# Patient Record
Sex: FEMALE | Race: WHITE | Employment: UNEMPLOYED | URBAN - METROPOLITAN AREA
[De-identification: names, ages, dates, MRNs, and addresses within clinical notes are randomized per-mention and may not be internally consistent; named-entity substitution may affect disease eponyms.]

---

## 2017-02-08 ENCOUNTER — OFFICE VISIT (OUTPATIENT)
Dept: FAMILY MEDICINE CLINIC | Age: 62
End: 2017-02-08

## 2017-02-08 ENCOUNTER — HOSPITAL ENCOUNTER (OUTPATIENT)
Dept: LAB | Age: 62
Discharge: HOME | End: 2017-02-08

## 2017-02-08 VITALS
WEIGHT: 174 LBS | BODY MASS INDEX: 31.62 KG/M2 | HEART RATE: 73 BPM | DIASTOLIC BLOOD PRESSURE: 84 MMHG | TEMPERATURE: 98.4 F | SYSTOLIC BLOOD PRESSURE: 145 MMHG

## 2017-02-08 DIAGNOSIS — E03.9 ACQUIRED HYPOTHYROIDISM: ICD-10-CM

## 2017-02-08 DIAGNOSIS — I10 ESSENTIAL HYPERTENSION: Primary | ICD-10-CM

## 2017-02-08 DIAGNOSIS — I10 ESSENTIAL HYPERTENSION: ICD-10-CM

## 2017-02-08 LAB
ALBUMIN SERPL BCP-MCNC: 4 G/DL (ref 3.5–5)
ALBUMIN/GLOB SERPL: 1.1 {RATIO} (ref 1.1–2.2)
ALP SERPL-CCNC: 92 U/L (ref 45–117)
ALT SERPL-CCNC: 49 U/L (ref 12–78)
ANION GAP BLD CALC-SCNC: 8 MMOL/L (ref 5–15)
AST SERPL W P-5'-P-CCNC: 40 U/L (ref 15–37)
BILIRUB SERPL-MCNC: 1 MG/DL (ref 0.2–1)
BUN SERPL-MCNC: 8 MG/DL (ref 6–20)
BUN/CREAT SERPL: 12 (ref 12–20)
CALCIUM SERPL-MCNC: 9.6 MG/DL (ref 8.5–10.1)
CHLORIDE SERPL-SCNC: 103 MMOL/L (ref 97–108)
CO2 SERPL-SCNC: 27 MMOL/L (ref 21–32)
CREAT SERPL-MCNC: 0.66 MG/DL (ref 0.55–1.02)
GLOBULIN SER CALC-MCNC: 3.8 G/DL (ref 2–4)
GLUCOSE SERPL-MCNC: 105 MG/DL (ref 65–100)
POTASSIUM SERPL-SCNC: 3.9 MMOL/L (ref 3.5–5.1)
PROT SERPL-MCNC: 7.8 G/DL (ref 6.4–8.2)
SODIUM SERPL-SCNC: 138 MMOL/L (ref 136–145)
TSH SERPL DL<=0.05 MIU/L-ACNC: 0.17 UIU/ML (ref 0.36–3.74)

## 2017-02-08 PROCEDURE — 80053 COMPREHEN METABOLIC PANEL: CPT | Performed by: NURSE PRACTITIONER

## 2017-02-08 PROCEDURE — 84443 ASSAY THYROID STIM HORMONE: CPT | Performed by: NURSE PRACTITIONER

## 2017-02-08 RX ORDER — LEVOTHYROXINE SODIUM 88 UG/1
TABLET ORAL
Qty: 30 TAB | Refills: 3 | Status: SHIPPED | OUTPATIENT
Start: 2017-02-08 | End: 2017-02-14 | Stop reason: DRUGHIGH

## 2017-02-08 RX ORDER — LISINOPRIL 20 MG/1
20 TABLET ORAL 2 TIMES DAILY
Qty: 60 TAB | Refills: 3 | Status: SHIPPED | OUTPATIENT
Start: 2017-02-08 | End: 2017-04-12 | Stop reason: SDUPTHER

## 2017-02-08 RX ORDER — AMLODIPINE BESYLATE 10 MG/1
TABLET ORAL
Qty: 30 TAB | Refills: 3 | Status: SHIPPED | OUTPATIENT
Start: 2017-02-08 | End: 2017-04-12 | Stop reason: SDUPTHER

## 2017-02-08 NOTE — PROGRESS NOTES
Coordination of Care  1. Have you been to the ER, urgent care clinic since your last visit? Hospitalized since your last visit? No    2. Have you seen or consulted any other health care providers outside of the [address] [place] [address] since your last visit? Include any pap smears or colon screening. No    Medications  Medication Reconciliation Performed: no  Patient does need refills     Learning Assessment Complete?  yes

## 2017-02-08 NOTE — PROGRESS NOTES
Assessment/Plan:       ICD-10-CM ICD-9-CM    1. Essential hypertension I10 401.9 lisinopril (PRINIVIL, ZESTRIL) 20 mg tablet      amLODIPine (NORVASC) 10 mg tablet      METABOLIC PANEL, COMPREHENSIVE   2. Acquired hypothyroidism E03.9 244.9 levothyroxine (SYNTHROID) 88 mcg tablet      TSH 3RD GENERATION     Follow-up Disposition:  Return in about 4 weeks (around 3/8/2017) for hypertension (systolic < 042?). [facility]  Subjective:   Pamela Alva is a 60 y.o. WHITE OR  female who speaks [place]. Chief Complaint   Patient presents with    Hypertension    Thyroid Problem    Medication Refill    History of Present Illness: no complaints. Thyroid  denies fatigue, weight changes, heat/cold intolerance, bowel/skin changes or CVS symptoms    Current Outpatient Prescriptions   Medication Sig Dispense Refill    lisinopril (PRINIVIL, ZESTRIL) 20 mg tablet Take 1 Tab by mouth two (2) times a day. 60 Tab 3    amLODIPine (NORVASC) 10 mg tablet TAKE ONE TABLET BY MOUTH ONE TIME DAILY 30 Tab 3    levothyroxine (SYNTHROID) 88 mcg tablet TAKE ONE TABLET BY MOUTH ONE TIME DAILY 30 Tab 3    cyclobenzaprine (FLEXERIL) 5 mg tablet Take 2 Tabs by mouth nightly as needed for Muscle Spasm(s). 30 Tab 0    ranitidine (ZANTAC) 150 mg tablet TAKE ONE OR TWO TABLETS BY MOUTH TWICE DAILY 120 Tab PRN    terbinafine (LAMISIL) 250 mg tablet Take 1 Tab by mouth daily. 14 Tab 0    clopidogrel (PLAVIX) 75 mg tablet Take 1 Tab by mouth daily. 90 Tab 3    fluticasone (FLONASE) 50 mcg/Actuation nasal spray USE 2 SPRAYS IN EACH NOSTRIL EVERY DAY 1 Bottle 11    fexofenadine (ALLEGRA) 180 mg tablet TAKE ONE TABLET BY MOUTH ONCE DAILY 30 Tab 11    budesonide-formoterol (SYMBICORT) 160-4.5 mcg/Actuation HFA inhaler Take 2 Puffs by inhalation two (2) times a day. 3 Inhaler 3    aspirin (ASPIRIN) 325 mg tablet Take 325 mg by mouth daily.  coenzyme q10 (CO Q-10) 10 mg Cap Take  by mouth.       OTHER,NON-FORMULARY, gugulipids (otc) once daily       clotrimazole-betamethasone (LOTRISONE) topical cream Apply  to affected area two (2) times a day. 15 g 0    nystatin (MYCOSTATIN) topical cream Apply  to affected area two (2) times a day. 45 g 0    albuterol (PROVENTIL, VENTOLIN) 90 mcg/Actuation inhaler Take 2 Puffs by inhalation every six (6) hours as needed.  omega-3 fatty acids-vitamin e (FISH OIL) 1,000 mg Cap Take 2 Caps by mouth two (2) times a day. Review of Systems: Negative for: fever, chest pain, shortness of breath, leg swelling, exertional dyspnea, palpitations. Past Surgical History: She  has a past surgical history that includes endoscopy, colon, diagnostic and cardiac surg procedure unlist.   Social History: She  reports that she has never smoked. She has never used smokeless tobacco. She reports that she does not drink alcohol. Objective:     Vitals:    02/08/17 1311   BP: 145/84   Pulse: 73   Temp: 98.4 °F (36.9 °C)   TempSrc: Oral   Weight: 174 lb (78.9 kg)    No LMP recorded. Patient is postmenopausal.   Wt Readings from Last 2 Encounters:   02/08/17 174 lb (78.9 kg)   12/14/16 175 lb (79.4 kg)     Lab Review:No results found for any visits on 02/08/17. Physical Examination:   General appearance - well developed, no acute distress. Neck - Supple, no adenopathy. Thyroid is normal in size without nodules or tenderness. Chest - clear to auscultation. Heart - regular rate and rhythm without murmurs, rubs, or gallops. Abdomen - bowel sounds present x 4, NT, ND. Extremities - pulses intact. No peripheral edema. Assessment/Plan:   Jing Peguero was seen today for hypertension, thyroid problem and medication refill. Diagnoses and all orders for this visit:    Essential hypertension  -     lisinopril (PRINIVIL, ZESTRIL) 20 mg tablet; Take 1 Tab by mouth two (2) times a day.   -     amLODIPine (NORVASC) 10 mg tablet; TAKE ONE TABLET BY MOUTH ONE TIME DAILY  -     METABOLIC PANEL, COMPREHENSIVE; Future    Acquired hypothyroidism  -     levothyroxine (SYNTHROID) 88 mcg tablet; TAKE ONE TABLET BY MOUTH ONE TIME DAILY  -     TSH 3RD GENERATION; Future    Follow-up Disposition:  Return in about 4 weeks (around 3/8/2017) for hypertension (systolic < 168?). -dry cough - discussed option of switching to ARB but she prefers to stay with ACEI due to cost.  Manish Mcduffie, MSN, RN, FNP-BC, BC-ADM  Leo Adkins expressed understanding of this plan.

## 2017-02-10 ENCOUNTER — TELEPHONE (OUTPATIENT)
Dept: FAMILY PLANNING/WOMEN'S HEALTH CLINIC | Age: 62
End: 2017-02-10

## 2017-02-10 NOTE — TELEPHONE ENCOUNTER
Answering machine identified as \"Mary. \"  I left a message to call CRISTAL. Information to discuss: If you have not had thyroid medication for 5 years, or any time recently, then your level represents that your thyroid is OVERactive (hyperthyroid). You need to STOP TAKING your medication levothyroxine. (This would help you if you were HYPOthyroid). When you return at your follow up appointment, we will check your thyroid tests again.   In summary: stop levothyroxine 88 mcg that I just prescribed; we will do labs at your follow up appointment.  (labs to check: TSH, free T4)

## 2017-02-14 DIAGNOSIS — E03.9 ACQUIRED HYPOTHYROIDISM: Primary | ICD-10-CM

## 2017-02-14 NOTE — TELEPHONE ENCOUNTER
The call was returned to the pt. She was told her last labs for her thyroid showed she had Hyperthyroid. The provider wanted her to stop the Levothyroxine 88 mcg now. She has an appt to return to the clinic on 04/12/17 at 2 pm with Royal Matt NP. Her thyroid levels will be rechecked on the day of her appt. She verbalized understanding.  Ethan Lara RN

## 2017-02-15 NOTE — PROGRESS NOTES
To clarify,  This patient had not taken thyroid medication for 10 years until she came to the clinic on 12/14/16. Her previous dose of medication was 88 mcg. I re-started her previous dose, then had her return to recheck levels at 8 weeks. Her levels then showed that tsh was too low.  TSH 02/08/2017 0.17* 0.36 - 3.74 uIU/mL Final    Comment: (NOTE)  Due to TSH heterogeneity, both structurally and degree of   glycosylation, monoclonal antibodies used in the TSH assay may not   accurately quantitate TSH. Therefore, this result should be   correlated with clinical findings as well as with other assessments   of thyroid function, e.g., free T4, free T3. I then had her stop the medication, to recheck levels off medication.

## 2017-04-12 ENCOUNTER — HOSPITAL ENCOUNTER (OUTPATIENT)
Dept: LAB | Age: 62
Discharge: HOME | End: 2017-04-12

## 2017-04-12 ENCOUNTER — OFFICE VISIT (OUTPATIENT)
Dept: FAMILY MEDICINE CLINIC | Age: 62
End: 2017-04-12

## 2017-04-12 VITALS
OXYGEN SATURATION: 97 % | BODY MASS INDEX: 33.07 KG/M2 | HEART RATE: 73 BPM | SYSTOLIC BLOOD PRESSURE: 141 MMHG | DIASTOLIC BLOOD PRESSURE: 88 MMHG | WEIGHT: 182 LBS | TEMPERATURE: 98.3 F

## 2017-04-12 DIAGNOSIS — E05.90 HYPERTHYROIDISM: ICD-10-CM

## 2017-04-12 DIAGNOSIS — J06.9 UPPER RESPIRATORY TRACT INFECTION, UNSPECIFIED TYPE: ICD-10-CM

## 2017-04-12 DIAGNOSIS — I10 ESSENTIAL HYPERTENSION: ICD-10-CM

## 2017-04-12 DIAGNOSIS — E05.90 HYPERTHYROIDISM: Primary | ICD-10-CM

## 2017-04-12 LAB
T4 FREE SERPL-MCNC: 0.6 NG/DL (ref 0.8–1.5)
TSH SERPL DL<=0.05 MIU/L-ACNC: 20.6 UIU/ML (ref 0.36–3.74)

## 2017-04-12 PROCEDURE — 84439 ASSAY OF FREE THYROXINE: CPT | Performed by: NURSE PRACTITIONER

## 2017-04-12 PROCEDURE — 84480 ASSAY TRIIODOTHYRONINE (T3): CPT | Performed by: NURSE PRACTITIONER

## 2017-04-12 PROCEDURE — 83520 IMMUNOASSAY QUANT NOS NONAB: CPT | Performed by: NURSE PRACTITIONER

## 2017-04-12 PROCEDURE — 84443 ASSAY THYROID STIM HORMONE: CPT | Performed by: NURSE PRACTITIONER

## 2017-04-12 RX ORDER — AMLODIPINE BESYLATE 10 MG/1
TABLET ORAL
Qty: 90 TAB | Refills: 11 | Status: SHIPPED | OUTPATIENT
Start: 2017-04-12 | End: 2018-05-02 | Stop reason: SDUPTHER

## 2017-04-12 RX ORDER — ALBUTEROL SULFATE 90 UG/1
2 AEROSOL, METERED RESPIRATORY (INHALATION)
Qty: 1 INHALER | Refills: 1 | Status: SHIPPED | OUTPATIENT
Start: 2017-04-12 | End: 2018-05-09

## 2017-04-12 RX ORDER — LISINOPRIL 20 MG/1
20 TABLET ORAL 2 TIMES DAILY
Qty: 180 TAB | Refills: 3 | Status: SHIPPED | OUTPATIENT
Start: 2017-04-12 | End: 2018-05-02 | Stop reason: SDUPTHER

## 2017-04-12 RX ORDER — AZITHROMYCIN 250 MG/1
TABLET, FILM COATED ORAL
Qty: 6 TAB | Refills: 0 | Status: SHIPPED | OUTPATIENT
Start: 2017-04-12 | End: 2017-04-17

## 2017-04-12 NOTE — PATIENT INSTRUCTIONS

## 2017-04-12 NOTE — PROGRESS NOTES
Avs discussed with Judy Hauser by Discharge Nurse Luc Garcia LPN. Discussed medications prescribed, good rx coupon given. Verbalized understanding and no further questions.  AVS printed and given to patient Luc Garcia LPN

## 2017-04-12 NOTE — PROGRESS NOTES
Coordination of Care  1. Have you been to the ER, urgent care clinic since your last visit? Hospitalized since your last visit? No    2. Have you seen or consulted any other health care providers outside of the [address] [place] [address] since your last visit? Include any pap smears or colon screening. No    Medications  Medication Reconciliation Performed: no  Patient does need refills     Learning Assessment Complete?  yes

## 2017-04-12 NOTE — PROGRESS NOTES
Subjective:     Chief Complaint   Patient presents with    Hypertension     f/u    Cough     x 12 days        She  is a 60 y.o. female who presents for routine f/u and URI complaints. Onset approx 1 1/2 weeks ago. Notes clear, productive exudate. No pattern r/t night/day. Does get worse when she lays down. Pt denies any fevers. Pt does report some ear fullness on L side. Trace sore throat r/t cough. No attempted remedies aside from hot soups/teas. ROS  Gen - no fever/chills  Resp - no dyspnea or cough  CV - no chest pain or ORTIZ  Rest per HPI    Past Medical History:   Diagnosis Date    Allergic rhinitis 1/13/2010    Asthma 1/13/2010    CAD (coronary artery disease) 1/13/2010    Depression with anxiety 1/13/2010    Dry eye syndrome 1/13/2010    Dyslipidemia 1/13/2010    Fatty liver 1/13/2010    GERD (gastroesophageal reflux disease) 1/13/2010    HTN (hypertension) 1/13/2010    Hypothyroidism 1/13/2010    Migraine 1/13/2010     Past Surgical History:   Procedure Laterality Date    CARDIAC SURG PROCEDURE UNLIST      Stint     ENDOSCOPY, COLON, DIAGNOSTIC      2/17/09  nicolette     Current Outpatient Prescriptions on File Prior to Visit   Medication Sig Dispense Refill    lisinopril (PRINIVIL, ZESTRIL) 20 mg tablet Take 1 Tab by mouth two (2) times a day. 60 Tab 3    amLODIPine (NORVASC) 10 mg tablet TAKE ONE TABLET BY MOUTH ONE TIME DAILY 30 Tab 3    cyclobenzaprine (FLEXERIL) 5 mg tablet Take 2 Tabs by mouth nightly as needed for Muscle Spasm(s). 30 Tab 0    ranitidine (ZANTAC) 150 mg tablet TAKE ONE OR TWO TABLETS BY MOUTH TWICE DAILY 120 Tab PRN    terbinafine (LAMISIL) 250 mg tablet Take 1 Tab by mouth daily. 14 Tab 0    clopidogrel (PLAVIX) 75 mg tablet Take 1 Tab by mouth daily.  90 Tab 3    fluticasone (FLONASE) 50 mcg/Actuation nasal spray USE 2 SPRAYS IN EACH NOSTRIL EVERY DAY 1 Bottle 11    fexofenadine (ALLEGRA) 180 mg tablet TAKE ONE TABLET BY MOUTH ONCE DAILY 30 Tab 11    budesonide-formoterol (SYMBICORT) 160-4.5 mcg/Actuation HFA inhaler Take 2 Puffs by inhalation two (2) times a day. 3 Inhaler 3    aspirin (ASPIRIN) 325 mg tablet Take 325 mg by mouth daily.  coenzyme q10 (CO Q-10) 10 mg Cap Take  by mouth.  OTHER,NON-FORMULARY, gugulipids (otc) once daily       clotrimazole-betamethasone (LOTRISONE) topical cream Apply  to affected area two (2) times a day. 15 g 0    nystatin (MYCOSTATIN) topical cream Apply  to affected area two (2) times a day. 45 g 0    albuterol (PROVENTIL, VENTOLIN) 90 mcg/Actuation inhaler Take 2 Puffs by inhalation every six (6) hours as needed.  omega-3 fatty acids-vitamin e (FISH OIL) 1,000 mg Cap Take 2 Caps by mouth two (2) times a day. No current facility-administered medications on file prior to visit. Objective:     Vitals:    04/12/17 1442   BP: 141/88   Pulse: 73   Temp: 98.3 °F (36.8 °C)   TempSrc: Oral   SpO2: 97%   Weight: 182 lb (82.6 kg)       Physical Examination:  General appearance - alert, well appearing, and in no distress  Eyes -sclera anicteric  Neck - supple, no significant adenopathy, no thyromegaly  Chest - clear to auscultation, exp wheezes in post lobes, rales or rhonchi, symmetric air entry  Heart - normal rate, regular rhythm, normal S1, S2, no murmurs, rubs, clicks or gallops  Neurological - alert, oriented, no focal findings or movement disorder noted  Moderate erythema in throat, tonsils +2,     Assessment/ Plan:   Helga Armendariz was seen today for hypertension and cough. Diagnoses and all orders for this visit:    Hyperthyroidism  -     T4, FREE; Future  -     T3 TOTAL; Future  -     TSH 3RD GENERATION; Future  -     TSH RECEPTOR AB; Future    Essential hypertension  -     amLODIPine (NORVASC) 10 mg tablet; TAKE ONE TABLET BY MOUTH ONE TIME DAILY  -     lisinopril (PRINIVIL, ZESTRIL) 20 mg tablet; Take 1 Tab by mouth two (2) times a day.     Upper respiratory tract infection, unspecified type  -     azithromycin (ZITHROMAX) 250 mg tablet; Take 2 tablets today, then take 1 tablet daily  -     albuterol (PROVENTIL HFA, VENTOLIN HFA, PROAIR HFA) 90 mcg/actuation inhaler; Take 2 Puffs by inhalation every four (4) hours as needed for Wheezing. Given exam findings and duration of S&S, recommend Tx w/ Abx and albuterol BID and PRN for wheezing. Check labs r/t low TSH from LOV. Refill HTN Rx. Follow up PRN based on thyroid labs. I have discussed the diagnosis with the patient and the intended plan as seen in the above orders. The patient has received an after-visit summary and questions were answered concerning future plans. I have discussed medication side effects and warnings with the patient as well. The patient verbalizes understanding and agreement with the plan. Follow-up Disposition:  Return if symptoms worsen or fail to improve.

## 2017-04-14 LAB
T3 SERPL-MCNC: 115 NG/DL (ref 71–180)
TSH RECEP AB SER-ACNC: <0.5 IU/L (ref 0–1.75)

## 2017-04-14 NOTE — PROGRESS NOTES
Please let Pt know her labs did confirm hypothyroidism and last lowTSH  levels were likely indicative of too high of a dose. Once she receives this message, please let me know I can send a new Rx for 77mcg to pharmacy and I would like her to come back for follow-up in 6-8 weeks for repeat thyroid levels.    Thank you,  Lor Urban

## 2017-04-27 NOTE — PROGRESS NOTES
Telephone call made to the patient to review the provider's lab result note and to schedule a follow-up appointment. The patient states that she has had some difficulty lately with her [employer] not honoring her Good Rx coupon card and that the cost of several of her prescriptions has increased. She is considering changing pharmacies, but has to check with her family since she does not drive. She will call the [facility] office soon to let us know where to send the Thyroid prescription and whether to transfer her other prescriptions. We looked at the [facility] website during our call and found that for example her amlodipine is less expensive at the [facility] than at [facility]. We scheduled her follow-up appointment for 06-14-17 at [address] [place] [address] and the patient knows to call the [facility] office to reschedule if she can not arrange transportation.  Mandy Flores RN

## 2017-04-28 ENCOUNTER — TELEPHONE (OUTPATIENT)
Dept: FAMILY MEDICINE CLINIC | Age: 62
End: 2017-04-28

## 2017-04-28 DIAGNOSIS — E03.9 ACQUIRED HYPOTHYROIDISM: Primary | ICD-10-CM

## 2017-04-28 RX ORDER — LEVOTHYROXINE SODIUM 75 UG/1
75 TABLET ORAL
Qty: 30 TAB | Refills: 3 | Status: SHIPPED | OUTPATIENT
Start: 2017-04-28 | End: 2017-06-14 | Stop reason: SDUPTHER

## 2017-04-28 NOTE — TELEPHONE ENCOUNTER
RN returned call to pt. She called the [facility] in [place], and if she purchases all of her current medications there, she will save $25.  SHe has already purchased one refill for Amlodipine, Lisinopril, and Albuterol inhaler at the [facility] in [place], so she has enough until her next appt on 6/14/17. She would like her thyroid medication to be escribed to the [facility] in [place]. RN has changed her preferred pharmacy to that [facility]. RN advised pt that she can request that her current medications be pulled to the [facility]. Message routed to Valerie Jesus NP, to order thyroid medication for pt. Pt expressed understanding of the above information. Katlin Valdovinos.

## 2017-04-28 NOTE — TELEPHONE ENCOUNTER
Patient said she was supposed to tell us where to send her new prescription. She said the [facility] in [place] is $50 cheaper for this medicine than the one in [place]. Also, she said she should have transportation for her upcoming appointment.     Rula Corado April

## 2017-05-10 NOTE — TELEPHONE ENCOUNTER
RN called pt to confirm that she is aware that an order for Levothyroxine was filled by Vicente Quezada NP on 4/28/17. Pt stated that she has picked up the medication.   Renny Costa RN

## 2017-06-13 DIAGNOSIS — E03.9 ACQUIRED HYPOTHYROIDISM: Primary | ICD-10-CM

## 2017-06-13 NOTE — PROGRESS NOTES
As part of her visit on 6/14/17, please draw TSH as ordered. I will plan to verify with the patient that she had been taking 88 mcg of levothyroxine for at least 6 weeks prior to the first TSH measurement in February when it was low.

## 2017-06-14 ENCOUNTER — HOSPITAL ENCOUNTER (OUTPATIENT)
Dept: LAB | Age: 62
Discharge: HOME | End: 2017-06-14

## 2017-06-14 ENCOUNTER — OFFICE VISIT (OUTPATIENT)
Dept: FAMILY MEDICINE CLINIC | Age: 62
End: 2017-06-14

## 2017-06-14 VITALS
BODY MASS INDEX: 33.68 KG/M2 | SYSTOLIC BLOOD PRESSURE: 127 MMHG | DIASTOLIC BLOOD PRESSURE: 75 MMHG | HEIGHT: 62 IN | TEMPERATURE: 98.3 F | WEIGHT: 183 LBS | HEART RATE: 77 BPM

## 2017-06-14 DIAGNOSIS — E03.9 ACQUIRED HYPOTHYROIDISM: ICD-10-CM

## 2017-06-14 DIAGNOSIS — E03.9 ACQUIRED HYPOTHYROIDISM: Primary | ICD-10-CM

## 2017-06-14 LAB — TSH SERPL DL<=0.05 MIU/L-ACNC: 0.36 UIU/ML (ref 0.36–3.74)

## 2017-06-14 PROCEDURE — 84443 ASSAY THYROID STIM HORMONE: CPT | Performed by: NURSE PRACTITIONER

## 2017-06-14 RX ORDER — LEVOTHYROXINE SODIUM 75 UG/1
75 TABLET ORAL
Qty: 90 TAB | Refills: 0 | Status: SHIPPED | OUTPATIENT
Start: 2017-06-14 | End: 2017-09-13 | Stop reason: SDUPTHER

## 2017-06-14 NOTE — MR AVS SNAPSHOT
Visit Information Date & Time Provider Department Dept. Phone Encounter #  
 6/14/2017  1:15 PM Juan Hazel NP [facility] [phone] [id number] Upcoming Health Maintenance Date Due Hepatitis C Screening 1955 Pneumococcal 19-64 Medium Risk (1 of 1 - PPSV23) 1/13/1973 PAP AKA CERVICAL CYTOLOGY 1/13/1975 BREAST CANCER SCRN MAMMOGRAM 1/13/2004 FOBT Q 1 YEAR AGE 50-75 1/13/2004 DTaP/Tdap/Td series (1 - Tdap) 4/27/2006 ZOSTER VACCINE AGE 60> 1/13/2014 INFLUENZA AGE 9 TO ADULT 8/1/2017 Allergies as of 6/14/2017  Review Complete On: 6/14/2017 By: Felecia Parra Severity Noted Reaction Type Reactions Lopid [Gemfibrozil]  01/13/2010   Intolerance Other (comments)  
 fatigue Metoprolol  01/13/2010    Other (comments)  
 fatigue Statins-hmg-coa Reductase Inhibitors  01/13/2010    Myalgia Tricor [Fenofibrate Nanocrystallized]  01/13/2010   Intolerance Other (comments)  
 fatigue Current Immunizations  Never Reviewed Name Date  
 TD Vaccine 4/26/2006 Not reviewed this visit You Were Diagnosed With   
  
 Codes Comments Acquired hypothyroidism    -  Primary ICD-10-CM: E03.9 ICD-9-CM: 214. 9 Vitals BP Pulse Temp Height(growth percentile) Weight(growth percentile) BMI  
 127/75 (BP 1 Location: Right arm, BP Patient Position: Sitting) 77 98.3 °F (36.8 °C) (Oral) 5' 1.85\" (1.571 m) 183 lb (83 kg) 33.63 kg/m2 OB Status Smoking Status Postmenopausal Never Smoker BMI and BSA Data Body Mass Index Body Surface Area  
 33.63 kg/m 2 1.9 m 2 Preferred Pharmacy Pharmacy Name Phone [facility] [address] [phone] Your Updated Medication List  
  
   
This list is accurate as of: 6/14/17  2:42 PM.  Always use your most recent med list.  
  
  
  
  
 albuterol 90 mcg/actuation inhaler Commonly known as:  PROVENTIL HFA, VENTOLIN HFA, PROAIR HFA Take 2 Puffs by inhalation every four (4) hours as needed for Wheezing. amLODIPine 10 mg tablet Commonly known as:  Humberto [place] TAKE ONE TABLET BY MOUTH ONE TIME DAILY  
  
 aspirin 325 mg tablet Commonly known as:  ASPIRIN Take 325 mg by mouth daily. fexofenadine 180 mg tablet Commonly known as:  Julien Lever TAKE ONE TABLET BY MOUTH ONCE DAILY FISH OIL 1,000 mg Cap Generic drug:  omega-3 fatty acids-vitamin e Take 2 Caps by mouth two (2) times a day. fluticasone 50 mcg/actuation nasal spray Commonly known as:  FLONASE  
USE 2 SPRAYS IN EACH NOSTRIL EVERY DAY  
  
 levothyroxine 75 mcg tablet Commonly known as:  SYNTHROID Take 1 Tab by mouth Daily (before breakfast). lisinopril 20 mg tablet Commonly known as:  Gayatri Gear Take 1 Tab by mouth two (2) times a day. Introducing [facility]! Ghazala Paredes introduces [employer] patient portal. Now you can access parts of your medical record, email your doctor's office, and request medication refills online. 1. In your internet browser, go to [email or web address] 2. Click on the First Time User? Click Here link in the Sign In box. You will see the New Member Sign Up page. 3. Enter your [employer] Access Code exactly as it appears below. You will not need to use this code after youve completed the sign-up process. If you do not sign up before the expiration date, you must request a new code. · [employer] Access Code: [email or web address] Expires: 9/12/2017  2:42 PM 
 
4. Enter the last four digits of your Social Security Number (xxxx) and Date of Birth (mm/dd/yyyy) as indicated and click Submit. You will be taken to the next sign-up page. 5. Create a [employer] ID. This will be your [employer] login ID and cannot be changed, so think of one that is secure and easy to remember. 6. Create a [employer] password. You can change your password at any time. 7. Enter your Password Reset Question and Answer. This can be used at a later time if you forget your password. 8. Enter your e-mail address. You will receive e-mail notification when new information is available in [address] 9. Click Sign Up. You can now view and download portions of your medical record. 10. Click the Download Summary menu link to download a portable copy of your medical information. If you have questions, please visit the Frequently Asked Questions section of the [employer] website. Remember, [employer] is NOT to be used for urgent needs. For medical emergencies, dial 911. Now available from your iPhone and Android! Please provide this summary of care documentation to your next provider. If you have any questions after today's visit, please call [phone].

## 2017-06-14 NOTE — PROGRESS NOTES
Patient seen for discharge. We reviewed the AVS, prescription and pharmacy. She understands to return to a [facility] clinic in 2 months for lab work.  Lucita Lawrence RN

## 2017-06-14 NOTE — PROGRESS NOTES
Coordination of Care  1. Have you been to the ER, urgent care clinic since your last visit? Hospitalized since your last visit? No    2. Have you seen or consulted any other health care providers outside of the [address] [place] [address] since your last visit? Include any pap smears or colon screening. No    Medications  Medication Reconciliation Performed: no  Patient does need refills     Learning Assessment Complete?  yes

## 2017-08-16 ENCOUNTER — HOSPITAL ENCOUNTER (OUTPATIENT)
Dept: LAB | Age: 62
Discharge: HOME | End: 2017-08-16

## 2017-08-16 ENCOUNTER — CLINICAL SUPPORT (OUTPATIENT)
Dept: FAMILY MEDICINE CLINIC | Age: 62
End: 2017-08-16

## 2017-08-16 DIAGNOSIS — Z13.9 ENCOUNTER FOR SCREENING: ICD-10-CM

## 2017-08-16 DIAGNOSIS — Z13.9 ENCOUNTER FOR SCREENING: Primary | ICD-10-CM

## 2017-08-16 LAB — TSH SERPL DL<=0.05 MIU/L-ACNC: 0.54 UIU/ML (ref 0.36–3.74)

## 2017-08-16 PROCEDURE — 84443 ASSAY THYROID STIM HORMONE: CPT | Performed by: NURSE PRACTITIONER

## 2017-08-16 NOTE — PROGRESS NOTES
Patient here for fasting labs only, labs drawn was an TSH in the RA, patient left lab with no bleeding, no pain, and feeling well. Patient was advise that a Dr or Nurse will call with the results to give update if any changes needed for the medicine. . Also the patient was advised she/he can discuss the results at next visit with the Dr. Veornica Oneal, Medical Assistant.

## 2017-09-13 ENCOUNTER — TELEPHONE (OUTPATIENT)
Dept: FAMILY MEDICINE CLINIC | Age: 62
End: 2017-09-13

## 2017-09-13 DIAGNOSIS — E03.9 ACQUIRED HYPOTHYROIDISM: ICD-10-CM

## 2017-09-13 RX ORDER — LEVOTHYROXINE SODIUM 75 UG/1
75 TABLET ORAL
Qty: 90 TAB | Refills: 1 | Status: SHIPPED | OUTPATIENT
Start: 2017-09-13 | End: 2018-02-28 | Stop reason: SDUPTHER

## 2017-09-13 NOTE — TELEPHONE ENCOUNTER
Telephone call made to the patient to let her know about the refilled prescription and to apologize for the delay in sending it in for her. The patient thanked me for the call and stated she would  her prescription today.  Charisse Garza RN

## 2017-09-13 NOTE — TELEPHONE ENCOUNTER
Please apologize for us taking so long to get to pharmacy and confirm that the pharmacy I sent it to (the one in chart) is the one she wanted it sent to).

## 2017-09-13 NOTE — TELEPHONE ENCOUNTER
Patient said she came to [facility] for blood work and we were supposed to call in refill for Levothyroxine to [facility] in [place]. I asked her to have pharmacy fax to [facility] a request for a refill.     Ruben Roman

## 2017-09-13 NOTE — TELEPHONE ENCOUNTER
Per chart review, TSH drawn on 08-16-17. Routing to Dr. Cassandra Jimenez as she is covering for the other provider.  Marco Antonio Elder RN

## 2018-02-28 ENCOUNTER — CLINICAL SUPPORT (OUTPATIENT)
Dept: FAMILY MEDICINE CLINIC | Age: 63
End: 2018-02-28

## 2018-02-28 DIAGNOSIS — E03.9 ACQUIRED HYPOTHYROIDISM: Primary | ICD-10-CM

## 2018-02-28 RX ORDER — LEVOTHYROXINE SODIUM 75 UG/1
75 TABLET ORAL
Qty: 90 TAB | Refills: 1 | Status: SHIPPED | OUTPATIENT
Start: 2018-02-28 | End: 2018-05-09 | Stop reason: SDUPTHER

## 2018-05-02 DIAGNOSIS — I10 ESSENTIAL HYPERTENSION: ICD-10-CM

## 2018-05-02 RX ORDER — AMLODIPINE BESYLATE 10 MG/1
10 TABLET ORAL DAILY
Qty: 30 TAB | Refills: 0 | Status: SHIPPED | OUTPATIENT
Start: 2018-05-02 | End: 2018-05-09 | Stop reason: SDUPTHER

## 2018-05-02 RX ORDER — LISINOPRIL 20 MG/1
20 TABLET ORAL 2 TIMES DAILY
Qty: 60 TAB | Refills: 0 | Status: SHIPPED | OUTPATIENT
Start: 2018-05-02 | End: 2018-05-09 | Stop reason: SDUPTHER

## 2018-05-02 NOTE — TELEPHONE ENCOUNTER
Telephone call made to patient's home/cell phone number to let her know about refilled prescriptions and the need to return to see a provider soon as no more medications will be refilled until then. There was no answer so a generic message was left to please call the [facility] office about prescriptions.  Arlet Adhikari RN

## 2018-05-02 NOTE — TELEPHONE ENCOUNTER
Return call received from the patient. We reviewed the prescriptions and need for her to return to a [facility] clinic soon to be seen by a provider. The patient stated that she does not drive and will have to ask her son to bring her to the [facility]. She stated that she usually has an appointment to see the provider. I explained to her that she has not been seen by a provider since June, 2017 and that she will need to make the line since appointments are only given out by the providers. She expressed understanding and we discussed three locations for May clinics that are close to her home in [place]. She plans to come to [address] [place] [address] on either 05-09-18 or 05-23-18.  Michael Haywood RN

## 2018-05-09 ENCOUNTER — OFFICE VISIT (OUTPATIENT)
Dept: FAMILY MEDICINE CLINIC | Age: 63
End: 2018-05-09

## 2018-05-09 ENCOUNTER — HOSPITAL ENCOUNTER (OUTPATIENT)
Dept: LAB | Age: 63
Discharge: HOME | End: 2018-05-09

## 2018-05-09 VITALS
HEIGHT: 62 IN | HEART RATE: 62 BPM | TEMPERATURE: 98 F | SYSTOLIC BLOOD PRESSURE: 132 MMHG | BODY MASS INDEX: 32.57 KG/M2 | DIASTOLIC BLOOD PRESSURE: 74 MMHG | WEIGHT: 177 LBS

## 2018-05-09 DIAGNOSIS — I25.10 CORONARY ARTERY DISEASE INVOLVING NATIVE HEART WITHOUT ANGINA PECTORIS, UNSPECIFIED VESSEL OR LESION TYPE: Primary | ICD-10-CM

## 2018-05-09 DIAGNOSIS — I10 ESSENTIAL HYPERTENSION: ICD-10-CM

## 2018-05-09 DIAGNOSIS — I25.10 CORONARY ARTERY DISEASE INVOLVING NATIVE HEART WITHOUT ANGINA PECTORIS, UNSPECIFIED VESSEL OR LESION TYPE: ICD-10-CM

## 2018-05-09 DIAGNOSIS — E78.5 DYSLIPIDEMIA: ICD-10-CM

## 2018-05-09 DIAGNOSIS — E03.9 ACQUIRED HYPOTHYROIDISM: ICD-10-CM

## 2018-05-09 LAB
ALBUMIN SERPL-MCNC: 4.3 G/DL (ref 3.5–5)
ALBUMIN/GLOB SERPL: 1 {RATIO} (ref 1.1–2.2)
ALP SERPL-CCNC: 95 U/L (ref 45–117)
ALT SERPL-CCNC: 20 U/L (ref 12–78)
ANION GAP SERPL CALC-SCNC: 9 MMOL/L (ref 5–15)
AST SERPL-CCNC: 19 U/L (ref 15–37)
BILIRUB SERPL-MCNC: 1.2 MG/DL (ref 0.2–1)
BUN SERPL-MCNC: 8 MG/DL (ref 6–20)
BUN/CREAT SERPL: 11 (ref 12–20)
CALCIUM SERPL-MCNC: 9.9 MG/DL (ref 8.5–10.1)
CHLORIDE SERPL-SCNC: 105 MMOL/L (ref 97–108)
CHOLEST SERPL-MCNC: 275 MG/DL
CO2 SERPL-SCNC: 27 MMOL/L (ref 21–32)
CREAT SERPL-MCNC: 0.74 MG/DL (ref 0.55–1.02)
GLOBULIN SER CALC-MCNC: 4.1 G/DL (ref 2–4)
GLUCOSE SERPL-MCNC: 94 MG/DL (ref 65–100)
HDLC SERPL-MCNC: 32 MG/DL
HDLC SERPL: 8.6 {RATIO} (ref 0–5)
LDLC SERPL CALC-MCNC: 166.8 MG/DL (ref 0–100)
LIPID PROFILE,FLP: ABNORMAL
POTASSIUM SERPL-SCNC: 4.2 MMOL/L (ref 3.5–5.1)
PROT SERPL-MCNC: 8.4 G/DL (ref 6.4–8.2)
SODIUM SERPL-SCNC: 141 MMOL/L (ref 136–145)
TRIGL SERPL-MCNC: 381 MG/DL (ref ?–150)
VLDLC SERPL CALC-MCNC: 76.2 MG/DL

## 2018-05-09 PROCEDURE — 80061 LIPID PANEL: CPT | Performed by: NURSE PRACTITIONER

## 2018-05-09 PROCEDURE — 80053 COMPREHEN METABOLIC PANEL: CPT | Performed by: NURSE PRACTITIONER

## 2018-05-09 RX ORDER — ASPIRIN 81 MG/1
81 TABLET ORAL DAILY
Qty: 90 TAB | Refills: 3
Start: 2018-05-09

## 2018-05-09 RX ORDER — AMLODIPINE BESYLATE 10 MG/1
10 TABLET ORAL DAILY
Qty: 90 TAB | Refills: 3 | Status: SHIPPED | OUTPATIENT
Start: 2018-05-09 | End: 2019-05-25 | Stop reason: SDUPTHER

## 2018-05-09 RX ORDER — LEVOTHYROXINE SODIUM 75 UG/1
75 TABLET ORAL
Qty: 90 TAB | Refills: 3 | Status: SHIPPED | OUTPATIENT
Start: 2018-05-09 | End: 2020-11-11

## 2018-05-09 RX ORDER — LISINOPRIL 20 MG/1
20 TABLET ORAL 2 TIMES DAILY
Qty: 180 TAB | Refills: 3 | Status: SHIPPED | OUTPATIENT
Start: 2018-05-09

## 2018-05-09 NOTE — MR AVS SNAPSHOT
[address] [place] [address] [address] [facility] 
[phone] Patient: Sedrick Waller MRN:  [id number] Visit Information Date & Time Provider Department Dept. Phone Encounter #  
 5/9/2018 10:00 AM Valerie Jim NP [facility] [id number] [phone] Upcoming Health Maintenance Date Due Hepatitis C Screening 1955 Pneumococcal 19-64 Medium Risk (1 of 1 - PPSV23) 1/13/1973 FOBT Q 1 YEAR AGE 50-75 1/13/2004 DTaP/Tdap/Td series (1 - Tdap) 4/27/2006 ZOSTER VACCINE AGE 60> 11/13/2013 BREAST CANCER SCRN MAMMOGRAM 6/6/2018* PAP AKA CERVICAL CYTOLOGY 6/6/2018* Influenza Age 5 to Adult 8/1/2018 *Topic was postponed. The date shown is not the original due date. Allergies as of 5/9/2018  Review Complete On: 5/9/2018 By: Valerie Jim NP Severity Noted Reaction Type Reactions Lopid [Gemfibrozil]  01/13/2010   Intolerance Other (comments)  
 fatigue Metoprolol  01/13/2010    Other (comments)  
 fatigue Statins-hmg-coa Reductase Inhibitors  01/13/2010    Myalgia Tricor [Fenofibrate Nanocrystallized]  01/13/2010   Intolerance Other (comments)  
 fatigue Current Immunizations  Never Reviewed Name Date  
 TD Vaccine 4/26/2006 Not reviewed this visit You Were Diagnosed With   
  
 Codes Comments Coronary artery disease involving native heart without angina pectoris, unspecified vessel or lesion type    -  Primary ICD-10-CM: I25.10 ICD-9-CM: 414.01 Essential hypertension     ICD-10-CM: I10 
ICD-9-CM: 401.9 Dyslipidemia     ICD-10-CM: E78.5 ICD-9-CM: 272.4 Acquired hypothyroidism     ICD-10-CM: E03.9 ICD-9-CM: 943. 9 Vitals BP Pulse Temp Height(growth percentile) Weight(growth percentile) BMI  
 132/74 (BP 1 Location: Right arm, BP Patient Position: Sitting) 62 98 °F (36.7 °C) (Oral) 5' 2.01\" (1.575 m) 177 lb (80.3 kg) 32.37 kg/m2 OB Status Smoking Status Postmenopausal Former Smoker Vitals History BMI and BSA Data Body Mass Index Body Surface Area  
 32.37 kg/m 2 1.87 m 2 Preferred Pharmacy Pharmacy Name Phone [facility] [address] [phone] Your Updated Medication List  
  
   
This list is accurate as of 5/9/18 12:05 PM.  Always use your most recent med list. amLODIPine 10 mg tablet Commonly known as:  Sundra Camargo Take 1 Tab by mouth daily. aspirin delayed-release 81 mg tablet Take 1 Tab by mouth daily. levothyroxine 75 mcg tablet Commonly known as:  SYNTHROID Take 1 Tab by mouth Daily (before breakfast). lisinopril 20 mg tablet Commonly known as:  Pecola Cypher Take 1 Tab by mouth two (2) times a day. Prescriptions Sent to Pharmacy Refills  
 amLODIPine (NORVASC) 10 mg tablet 3 Sig: Take 1 Tab by mouth daily. Class: Normal  
 Pharmacy: [facility] [address] [facility] [address] [place] [address] Ph #: [phone] Route: Oral  
 levothyroxine (SYNTHROID) 75 mcg tablet 3 Sig: Take 1 Tab by mouth Daily (before breakfast). Class: Normal  
 Pharmacy: [facility] [address] [facility] [address] [place] [address] Ph #: [phone] Route: Oral  
 lisinopril (PRINIVIL, ZESTRIL) 20 mg tablet 3 Sig: Take 1 Tab by mouth two (2) times a day. Class: Normal  
 Pharmacy: [facility] [address] [facility] [address] [place] [address] Ph #: [phone] Route: Oral  
  
Introducing [facility]! Poornima Carroll introduces [employer] patient portal. Now you can access parts of your medical record, email your doctor's office, and request medication refills online. 1. In your internet browser, go to [email or web address] 2. Click on the First Time User? Click Here link in the Sign In box. You will see the New Member Sign Up page. 3. Enter your [employer] Access Code exactly as it appears below. You will not need to use this code after youve completed the sign-up process. If you do not sign up before the expiration date, you must request a new code. · [employer] Access Code: [email or web address] Expires: 5/29/2018  9:56 AM 
 
4. Enter the last four digits of your Social Security Number (xxxx) and Date of Birth (mm/dd/yyyy) as indicated and click Submit. You will be taken to the next sign-up page. 5. Create a Boundary ID. This will be your Boundary login ID and cannot be changed, so think of one that is secure and easy to remember. 6. Create a Boundary password. You can change your password at any time. 7. Enter your Password Reset Question and Answer. This can be used at a later time if you forget your password. 8. Enter your e-mail address. You will receive e-mail notification when new information is available in [address]. 9. Click Sign Up. You can now view and download portions of your medical record. 10. Click the Download Summary menu link to download a portable copy of your medical information. If you have questions, please visit the Frequently Asked Questions section of the [employer] website. Remember, [employer] is NOT to be used for urgent needs. For medical emergencies, dial 911. Now available from your iPhone and Android! Please provide this summary of care documentation to your next provider. If you have any questions after today's visit, please call [phone].

## 2018-05-09 NOTE — PROGRESS NOTES
Coordination of Care  1. Have you been to the ER, urgent care clinic since your last visit? Hospitalized since your last visit? No    2. Have you seen or consulted any other health care providers outside of the [address] [place] [address] since your last visit? Include any pap smears or colon screening. No    Does the patient need refills? YES    Learning Assessment Complete?  yes  Depression Screening complete in the past 12 months? yes

## 2018-05-09 NOTE — PROGRESS NOTES
Reviewed AVS, prescription and pharmacy location with patient. Goodrx coupon for Amlodipine printed and given the patient. Patient verbalized understanding . No questions or concern from patient at this time.  Anirudh Trevizo RN

## 2018-05-09 NOTE — PROGRESS NOTES
Assessment/Plan:       ICD-10-CM ICD-9-CM    1. Coronary artery disease involving native heart without angina pectoris, unspecified vessel or lesion type I25.10 414.01 aspirin delayed-release 81 mg tablet      LIPID PANEL   2. Essential hypertension U37 980.8 METABOLIC PANEL, COMPREHENSIVE      amLODIPine (NORVASC) 10 mg tablet      lisinopril (PRINIVIL, ZESTRIL) 20 mg tablet   3. Dyslipidemia E78.5 272.4    4. Acquired hypothyroidism E03.9 244.9 levothyroxine (SYNTHROID) 75 mcg tablet     Follow-up Disposition:  Return as needed. [address] [address]  Phone: [phone] Fax: [phone] [address] Rizzo, [address] [place] [address] Avenue  [address] [facility]  [address] [place] [address]  Phone: [phone] Fax: [phone]      [facility] [address] [facility]  Subjective:     Chief Complaint   Patient presents with    Thyroid Problem     f/u,     Hypertension     f/u    Reyna Thurman is a 58 y.o. WHITE OR  female. HPI:   She  has a past medical history of Allergic rhinitis (1/13/2010); Asthma (1/13/2010); CAD (coronary artery disease) (1/13/2010); Depression with anxiety (1/13/2010); Dry eye syndrome (1/13/2010); Dyslipidemia (1/13/2010); Fatty liver (1/13/2010); GERD (gastroesophageal reflux disease) (1/13/2010); HTN (hypertension) (1/13/2010); Hypothyroidism (1/13/2010); and Migraine (1/13/2010). Review of Systems: Negative for: fever, chest pain, shortness of breath, leg swelling, exertional dyspnea, palpitations. Current Medications:   No current outpatient prescriptions on file prior to visit. No current facility-administered medications on file prior to visit. Social History: She  reports that she has quit smoking. She quit after 30.00 years of use. She has never used smokeless tobacco. She reports that she does not drink alcohol or use illicit drugs.   Objective:     Vitals:    05/09/18 0936   BP: 132/74   Pulse: 62   Temp: 98 °F (36.7 °C)   TempSrc: Oral   Weight: 177 lb (80.3 kg)   Height: 5' 2.01\" (1.575 m)    No LMP recorded. Patient is postmenopausal.   Wt Readings from Last 2 Encounters:   05/09/18 177 lb (80.3 kg)   06/14/17 183 lb (83 kg)     No results found for any visits on 05/09/18. Physical Examination:  General appearance - well developed, no acute distress. Chest - clear to auscultation. Heart - regular rate and rhythm without murmurs, rubs, or gallops. Abdomen - bowel sounds present x 4, NT, ND. Extremities - pulses intact. No peripheral edema. Assessment/Plan:   Diagnoses and all orders for this visit:    1. Coronary artery disease involving native heart without angina pectoris, unspecified vessel or lesion type  -     aspirin delayed-release 81 mg tablet; Take 1 Tab by mouth daily.  -     LIPID PANEL; Future    2. Essential hypertension  -     METABOLIC PANEL, COMPREHENSIVE; Future  -     amLODIPine (NORVASC) 10 mg tablet; Take 1 Tab by mouth daily. -     lisinopril (PRINIVIL, ZESTRIL) 20 mg tablet; Take 1 Tab by mouth two (2) times a day. 3. Dyslipidemia    4. Acquired hypothyroidism  -     levothyroxine (SYNTHROID) 75 mcg tablet; Take 1 Tab by mouth Daily (before breakfast). Follow-up Disposition:  Return as needed. Antony Saldaña, DNP, FNP-BC, BC-ADM  Musa Cadet expressed understanding of this plan.

## 2018-09-19 NOTE — PROGRESS NOTES
Assessment/Plan:       ICD-10-CM ICD-9-CM    1. Acquired hypothyroidism E03.9 244.9 levothyroxine (SYNTHROID) 75 mcg tablet      TSH 3RD GENERATION    she would like 90 day supply    [facility]  Subjective:     Chief Complaint   Patient presents with    Thyroid Problem     follow up     Martín Blanc is a 60 y.o. WHITE OR  female. HPI: I verified that she had not been on any medication for thyroid \"for years. \"  We started her on 88 mcg/day of levothyroxine because this is a dose she had taken years ago. The first measurement represented her tsh after being on 88 mcg, which turned out to be too high of a dose. She also noticed she didn't feel as well. She has felt much better since switching to the 75 mcg. She did not buy it right away; she was stretching out the 88 mcg levothyroxine by taking 1/2 tab daily until filling the new dose of levothyroxine. - she has been on the 75 mcg for about 1 month. Cannot drive herself due to cataracts and relies on getting a ride. Return 2 mos for labs only tsh. She  has a past medical history of Allergic rhinitis (1/13/2010); Asthma (1/13/2010); CAD (coronary artery disease) (1/13/2010); Depression with anxiety (1/13/2010); Dry eye syndrome (1/13/2010); Dyslipidemia (1/13/2010); Fatty liver (1/13/2010); GERD (gastroesophageal reflux disease) (1/13/2010); HTN (hypertension) (1/13/2010); Hypothyroidism (1/13/2010); and Migraine (1/13/2010). She has CAD (coronary artery disease), HTN (hypertension), Dyslipidemia, Asthma, Fatty liver, Hypothyroidism, GERD (gastroesophageal reflux disease), Dry eye syndrome, Depression with anxiety, Migraine, and Allergic rhinitis on her problem list.   Review of Systems: Negative for: fever, chest pain, shortness of breath, leg swelling, exertional dyspnea, palpitations.   Current Medications:   Current Outpatient Prescriptions on File Prior to Visit   Medication Sig    amLODIPine (NORVASC) 10 mg tablet TAKE Home health called and said they were picking up all her oxygen supplies.  Her daughter feels she still needs the oxygen and would like to speak to Milana Huerta.   ONE TABLET BY MOUTH ONE TIME DAILY    lisinopril (PRINIVIL, ZESTRIL) 20 mg tablet Take 1 Tab by mouth two (2) times a day.  albuterol (PROVENTIL HFA, VENTOLIN HFA, PROAIR HFA) 90 mcg/actuation inhaler Take 2 Puffs by inhalation every four (4) hours as needed for Wheezing.  aspirin (ASPIRIN) 325 mg tablet Take 325 mg by mouth daily.  omega-3 fatty acids-vitamin e (FISH OIL) 1,000 mg Cap Take 2 Caps by mouth two (2) times a day. No current facility-administered medications on file prior to visit. Past Surgical History: She  has a past surgical history that includes endoscopy, colon, diagnostic and cardiac surg procedure unlist (2008). Social and Family History: She  reports that she has never smoked. She has never used smokeless tobacco. She reports that she does not drink alcohol or use illicit drugs. ; family history includes Cancer in her father and another family member; Diabetes in her brother, father, and mother; Heart Disease in her father. Objective:     Vitals:    06/14/17 1307   BP: 127/75   Pulse: 77   Temp: 98.3 °F (36.8 °C)   TempSrc: Oral   Weight: 183 lb (83 kg)   Height: 5' 1.85\" (1.571 m)    No LMP recorded. Patient is postmenopausal.   Wt Readings from Last 2 Encounters:   06/14/17 183 lb (83 kg)   04/12/17 182 lb (82.6 kg)     No results found for any visits on 06/14/17. Physical Examination: neck supple, no lymphadenopathy or thyromegaly. General appearance - well developed, no acute distress. Chest - clear to auscultation. Heart - regular rate and rhythm without murmurs, rubs, or gallops. Abdomen - bowel sounds present x 4, NT, ND. Extremities - pulses intact. No peripheral edema. Assessment/Plan:   Vickey Pozo was seen today for thyroid problem. Diagnoses and all orders for this visit:    Acquired hypothyroidism  -     levothyroxine (SYNTHROID) 75 mcg tablet; Take 1 Tab by mouth Daily (before breakfast). -     TSH 3RD GENERATION;  Future  Follow-up Disposition:  Return in about 2 months (around 8/14/2017) for tsh; return fasting and we will also measure lipid panel (cholesterol). I explained the importance/rationale of returning in 8 weeks to measure TSH. If this level is within the normal range, we can continue this dose for a year. I don't expect the level drawn today to be accurate because her levothyroxine dose has fluctuated between 44 mcg and 75 mcg. Bridgette Malloy, AMBER, FNP-BC, BC-ADM  Santosh Keep expressed understanding of this plan.

## 2019-05-25 DIAGNOSIS — I10 ESSENTIAL HYPERTENSION: ICD-10-CM

## 2019-05-28 RX ORDER — AMLODIPINE BESYLATE 10 MG/1
TABLET ORAL
Qty: 90 TAB | Refills: 0 | Status: SHIPPED | OUTPATIENT
Start: 2019-05-28

## 2019-06-03 NOTE — TELEPHONE ENCOUNTER
Called to inform refill sent to [facility] on 5/28/19. Pt states she has gotten Medicaid and now has to have prior authorization and med is expensive. She \"has a new pt appt on 6/10/19 and will wait until then for refills. She may try to see if pharmacy will give a few pills till 6/10/19 but she has lost a good amt of weight and may not dale the old dose of medication. \"

## 2020-11-14 ENCOUNTER — HOSPITAL ENCOUNTER (OUTPATIENT)
Dept: PREADMISSION TESTING | Age: 65
Discharge: HOME | End: 2020-11-14
Payer: MEDICARE

## 2020-11-14 PROCEDURE — 87635 SARS-COV-2 COVID-19 AMP PRB: CPT

## 2020-11-15 LAB
HEALTH STATUS, XMCV2T: NORMAL
SARS-COV-2, COV2NT: NOT DETECTED
SOURCE, COVRS: NORMAL
SPECIMEN SOURCE, FCOV2M: NORMAL
SPECIMEN TYPE, XMCV1T: NORMAL

## 2020-11-17 PROBLEM — H25.812 COMBINED FORMS OF AGE-RELATED CATARACT OF LEFT EYE: Status: ACTIVE | Noted: 2020-11-17

## 2020-11-20 ENCOUNTER — HOSPITAL ENCOUNTER (OUTPATIENT)
Dept: PREADMISSION TESTING | Age: 65
Discharge: HOME | End: 2020-11-20

## 2022-03-20 PROBLEM — H25.812 COMBINED FORMS OF AGE-RELATED CATARACT OF LEFT EYE: Status: ACTIVE | Noted: 2020-11-17

## 2023-05-29 RX ORDER — AMLODIPINE BESYLATE 10 MG/1
1 TABLET ORAL DAILY
COMMUNITY
Start: 2019-05-28

## 2023-05-29 RX ORDER — ACETAMINOPHEN 500 MG
325 TABLET ORAL EVERY 6 HOURS PRN
COMMUNITY

## 2023-05-29 RX ORDER — LEVOTHYROXINE SODIUM 0.05 MG/1
50 TABLET ORAL
COMMUNITY

## 2023-05-29 RX ORDER — LISINOPRIL 20 MG/1
20 TABLET ORAL 2 TIMES DAILY
COMMUNITY
Start: 2018-05-09

## 2023-05-29 RX ORDER — ASPIRIN 81 MG/1
81 TABLET ORAL DAILY
COMMUNITY
Start: 2018-05-09